# Patient Record
Sex: MALE | Race: OTHER | Employment: FULL TIME | URBAN - METROPOLITAN AREA
[De-identification: names, ages, dates, MRNs, and addresses within clinical notes are randomized per-mention and may not be internally consistent; named-entity substitution may affect disease eponyms.]

---

## 2017-01-23 ENCOUNTER — OFFICE VISIT (OUTPATIENT)
Dept: FAMILY MEDICINE CLINIC | Facility: CLINIC | Age: 52
End: 2017-01-23

## 2017-01-23 VITALS
DIASTOLIC BLOOD PRESSURE: 69 MMHG | WEIGHT: 173 LBS | HEART RATE: 72 BPM | SYSTOLIC BLOOD PRESSURE: 113 MMHG | BODY MASS INDEX: 26 KG/M2

## 2017-01-23 DIAGNOSIS — Z79.4 TYPE 2 DIABETES MELLITUS WITH DIABETIC POLYNEUROPATHY, WITH LONG-TERM CURRENT USE OF INSULIN (HCC): Primary | ICD-10-CM

## 2017-01-23 DIAGNOSIS — E78.2 MIXED HYPERLIPIDEMIA: ICD-10-CM

## 2017-01-23 DIAGNOSIS — E11.42 TYPE 2 DIABETES MELLITUS WITH DIABETIC POLYNEUROPATHY, WITH LONG-TERM CURRENT USE OF INSULIN (HCC): Primary | ICD-10-CM

## 2017-01-23 DIAGNOSIS — Z91.19 NONCOMPLIANCE: ICD-10-CM

## 2017-01-23 PROCEDURE — 99215 OFFICE O/P EST HI 40 MIN: CPT | Performed by: FAMILY MEDICINE

## 2017-01-23 PROCEDURE — 99212 OFFICE O/P EST SF 10 MIN: CPT | Performed by: FAMILY MEDICINE

## 2017-01-23 RX ORDER — BLOOD SUGAR DIAGNOSTIC
1 STRIP MISCELLANEOUS 4 TIMES DAILY
Qty: 400 EACH | Refills: 3 | Status: SHIPPED | OUTPATIENT
Start: 2017-01-23 | End: 2017-08-02

## 2017-01-23 RX ORDER — PEN NEEDLE, DIABETIC 31 G X1/4"
NEEDLE, DISPOSABLE MISCELLANEOUS
Qty: 400 EACH | Refills: 3 | Status: SHIPPED | OUTPATIENT
Start: 2017-01-23 | End: 2017-01-24 | Stop reason: RX

## 2017-01-23 RX ORDER — ASPIRIN 81 MG/1
81 TABLET ORAL DAILY
Qty: 100 TABLET | Refills: 3 | Status: SHIPPED | OUTPATIENT
Start: 2017-01-23 | End: 2017-08-02

## 2017-01-23 RX ORDER — LISINOPRIL 10 MG/1
10 TABLET ORAL DAILY
Qty: 90 TABLET | Refills: 0 | Status: SHIPPED | OUTPATIENT
Start: 2017-01-23 | End: 2017-08-02

## 2017-01-23 RX ORDER — ATORVASTATIN CALCIUM 20 MG/1
20 TABLET, FILM COATED ORAL
Qty: 90 TABLET | Refills: 0 | Status: SHIPPED | OUTPATIENT
Start: 2017-01-23 | End: 2017-08-02

## 2017-01-23 NOTE — PROGRESS NOTES
Diabetic Visit  Lost insurance and didn't use insulin. \"I haven't checked my sugar in year. \"  Noncompliant with diet checking and insulin. Has numbness bottom of left foot. Patient denies problems with their medication.   Denies ulcers, chest pain, d

## 2017-01-24 ENCOUNTER — TELEPHONE (OUTPATIENT)
Dept: FAMILY MEDICINE CLINIC | Facility: CLINIC | Age: 52
End: 2017-01-24

## 2017-01-24 NOTE — TELEPHONE ENCOUNTER
pen needles 31 x 6 are not in stock. Can it be changed to 4 x 32? Pharmacy will need a new Rx if changed.

## 2017-02-03 ENCOUNTER — APPOINTMENT (OUTPATIENT)
Dept: LAB | Age: 52
End: 2017-02-03
Attending: FAMILY MEDICINE
Payer: COMMERCIAL

## 2017-02-03 DIAGNOSIS — Z79.4 TYPE 2 DIABETES MELLITUS WITH DIABETIC POLYNEUROPATHY, WITH LONG-TERM CURRENT USE OF INSULIN (HCC): ICD-10-CM

## 2017-02-03 DIAGNOSIS — E11.42 TYPE 2 DIABETES MELLITUS WITH DIABETIC POLYNEUROPATHY, WITH LONG-TERM CURRENT USE OF INSULIN (HCC): ICD-10-CM

## 2017-02-03 LAB
ALBUMIN SERPL BCP-MCNC: 3.4 G/DL (ref 3.5–4.8)
ALBUMIN/GLOB SERPL: 1.1 {RATIO} (ref 1–2)
ALP SERPL-CCNC: 51 U/L (ref 32–100)
ALT SERPL-CCNC: 26 U/L (ref 17–63)
ANION GAP SERPL CALC-SCNC: 9 MMOL/L (ref 0–18)
AST SERPL-CCNC: 19 U/L (ref 15–41)
BILIRUB SERPL-MCNC: 0.6 MG/DL (ref 0.3–1.2)
BUN SERPL-MCNC: 11 MG/DL (ref 8–20)
BUN/CREAT SERPL: 21.2 (ref 10–20)
CALCIUM SERPL-MCNC: 8.6 MG/DL (ref 8.5–10.5)
CHLORIDE SERPL-SCNC: 104 MMOL/L (ref 95–110)
CHOLEST SERPL-MCNC: 159 MG/DL (ref 110–200)
CO2 SERPL-SCNC: 26 MMOL/L (ref 22–32)
CREAT SERPL-MCNC: 0.52 MG/DL (ref 0.5–1.5)
GLOBULIN PLAS-MCNC: 3 G/DL (ref 2.5–3.7)
GLUCOSE SERPL-MCNC: 146 MG/DL (ref 70–99)
HBA1C MFR BLD: 12 % (ref 4–6)
HDLC SERPL-MCNC: 50 MG/DL
LDLC SERPL CALC-MCNC: 89 MG/DL (ref 0–99)
NONHDLC SERPL-MCNC: 109 MG/DL
OSMOLALITY UR CALC.SUM OF ELEC: 290 MOSM/KG (ref 275–295)
POTASSIUM SERPL-SCNC: 3.9 MMOL/L (ref 3.3–5.1)
PROT SERPL-MCNC: 6.4 G/DL (ref 5.9–8.4)
SODIUM SERPL-SCNC: 139 MMOL/L (ref 136–144)
TRIGL SERPL-MCNC: 98 MG/DL (ref 1–149)

## 2017-02-03 PROCEDURE — 36415 COLL VENOUS BLD VENIPUNCTURE: CPT

## 2017-02-03 PROCEDURE — 80053 COMPREHEN METABOLIC PANEL: CPT

## 2017-02-03 PROCEDURE — 83036 HEMOGLOBIN GLYCOSYLATED A1C: CPT

## 2017-02-03 PROCEDURE — 80061 LIPID PANEL: CPT

## 2017-02-06 ENCOUNTER — TELEPHONE (OUTPATIENT)
Dept: FAMILY MEDICINE CLINIC | Facility: CLINIC | Age: 52
End: 2017-02-06

## 2017-02-06 NOTE — TELEPHONE ENCOUNTER
Notes Recorded by Lola Clements DO on 2/5/2017 at 2:29 PM  Error  Do not send normal mammogram report  Please call patient. [facility] had very high sugars have him follow up this week.

## 2017-02-06 NOTE — TELEPHONE ENCOUNTER
TYE-CELINA, please schedule an appt to discuss test results with [facility].

## 2017-02-07 ENCOUNTER — TELEPHONE (OUTPATIENT)
Dept: FAMILY MEDICINE CLINIC | Facility: CLINIC | Age: 52
End: 2017-02-07

## 2017-03-03 NOTE — TELEPHONE ENCOUNTER
Current Outpatient Prescriptions:  Insulin Lispro, Human, (HUMALOG KWIKPEN) 100 UNIT/ML Subcutaneous Solution 15 units SQ TID with meals Disp: 15 mL Rfl: 3     Patient requesting refill for meds, Please call when ready

## 2017-03-06 NOTE — TELEPHONE ENCOUNTER
Diabetes Medications: Failed per protocol please advise- patient has follow up scheduled 3/10/17    Protocol Criteria:  · Appointment scheduled in the past 6 months or the next 3 months  · A1C < 7.5 in the past 6 months  · Creatinine in the past 12 months

## 2017-03-13 RX ORDER — INSULIN LISPRO 100 [IU]/ML
INJECTION, SOLUTION INTRAVENOUS; SUBCUTANEOUS
Qty: 15 PEN | Refills: 3 | Status: SHIPPED | OUTPATIENT
Start: 2017-03-13 | End: 2017-08-02

## 2017-04-27 ENCOUNTER — TELEPHONE (OUTPATIENT)
Dept: INTERNAL MEDICINE CLINIC | Facility: CLINIC | Age: 52
End: 2017-04-27

## 2017-04-27 NOTE — TELEPHONE ENCOUNTER
[facility],but dr name is wrong.  They had lynn borjas  They will refax please call  [facility] with a update on auth  This if for the lantus

## 2017-04-28 ENCOUNTER — TELEPHONE (OUTPATIENT)
Dept: FAMILY MEDICINE CLINIC | Facility: CLINIC | Age: 52
End: 2017-04-28

## 2017-04-28 DIAGNOSIS — Z79.4 DIABETES MELLITUS TYPE 2, INSULIN DEPENDENT (HCC): Primary | ICD-10-CM

## 2017-04-28 DIAGNOSIS — E11.9 DIABETES MELLITUS TYPE 2, INSULIN DEPENDENT (HCC): Primary | ICD-10-CM

## 2017-04-29 NOTE — TELEPHONE ENCOUNTER
Please advise on Rx message below. Thank you.     LANTUS IS NOT COVERED BY INS PLAN ,,, PLEASE CHANGE TO BASAGLAR- TIER 1 OR  LEVEMIR - TIER 2

## 2017-05-02 NOTE — TELEPHONE ENCOUNTER
[phone] then [phone]. Pt needs a OV and A1c to be done.

## 2017-05-22 NOTE — TELEPHONE ENCOUNTER
No call back from pt to schedule appt. No answer at [phone], again lmtcb ext [phone] until 12 noon today only, then ext [phone] thereafter. Dr [facility],    Pharmacy stated alternatives for lantus is levimar or basaglar.

## 2017-08-08 ENCOUNTER — TELEPHONE (OUTPATIENT)
Dept: FAMILY MEDICINE CLINIC | Facility: CLINIC | Age: 52
End: 2017-08-08

## 2017-08-08 RX ORDER — ATORVASTATIN CALCIUM 20 MG/1
20 TABLET, FILM COATED ORAL
Qty: 90 TABLET | Refills: 0 | Status: SHIPPED
Start: 2017-08-08 | End: 2018-06-19

## 2017-08-08 RX ORDER — LISINOPRIL 10 MG/1
10 TABLET ORAL DAILY
Qty: 90 TABLET | Refills: 0 | Status: SHIPPED
Start: 2017-08-08 | End: 2018-06-19

## 2017-08-08 RX ORDER — BLOOD SUGAR DIAGNOSTIC
1 STRIP MISCELLANEOUS 4 TIMES DAILY
Qty: 400 EACH | Refills: 3 | Status: SHIPPED
Start: 2017-08-08

## 2017-08-08 RX ORDER — ASPIRIN 81 MG/1
81 TABLET ORAL DAILY
Qty: 100 TABLET | Refills: 0 | Status: SHIPPED
Start: 2017-08-08 | End: 2017-11-07

## 2017-08-08 NOTE — TELEPHONE ENCOUNTER
[facility] calling states rx for meter was received but no other rx for supplies was sent. requesting rx for test strips and lancets once touch ultra.

## 2017-08-08 NOTE — TELEPHONE ENCOUNTER
Hypertensive Medications  Protocol Criteria:  · Appointment scheduled in the past 6 months or in the next 3 months  · BMP or CMP in the past 12 months  · Creatinine result < 2  Recent Outpatient Visits            6 months ago Type 2 diabetes mellitus with Type II or unspecified type diabetes mellitus without mention of complication, uncontrolled [facility])    Avelina Perera, Laith Dixon MD    Office Visit    2 years ago Type II or unspecified type diabete

## 2017-08-09 NOTE — TELEPHONE ENCOUNTER
Test strips and lancets were included in original rx, but called into pharmacy. Quatity 100 on each with directions of use to test blood glucose three times daily.

## 2017-08-17 ENCOUNTER — OFFICE VISIT (OUTPATIENT)
Dept: FAMILY MEDICINE CLINIC | Facility: CLINIC | Age: 52
End: 2017-08-17

## 2017-08-17 VITALS
HEART RATE: 71 BPM | DIASTOLIC BLOOD PRESSURE: 57 MMHG | SYSTOLIC BLOOD PRESSURE: 93 MMHG | BODY MASS INDEX: 31 KG/M2 | WEIGHT: 202 LBS

## 2017-08-17 DIAGNOSIS — M79.672 PAIN IN BOTH FEET: ICD-10-CM

## 2017-08-17 DIAGNOSIS — IMO0001 DIABETES MELLITUS DUE TO UNDERLYING CONDITION, UNCONTROLLED, WITHOUT COMPLICATION, WITH LONG-TERM CURRENT USE OF INSULIN: Primary | ICD-10-CM

## 2017-08-17 DIAGNOSIS — Z72.0 TOBACCO ABUSE: ICD-10-CM

## 2017-08-17 DIAGNOSIS — M79.671 PAIN IN BOTH FEET: ICD-10-CM

## 2017-08-17 PROCEDURE — 99212 OFFICE O/P EST SF 10 MIN: CPT | Performed by: FAMILY MEDICINE

## 2017-08-17 PROCEDURE — 99214 OFFICE O/P EST MOD 30 MIN: CPT | Performed by: FAMILY MEDICINE

## 2017-08-17 NOTE — PATIENT INSTRUCTIONS
Cómo conseguir apoyo para dejar de fumar  Dejar de fumar no tiene por qué ser [employer]. Dígale a los demás que va a dejarlo. El [de-identified] de los amigos, compañeros de [place] y familiares puede significar ashanti gran diferencia.  Suzan Pulido Algunas veces es posible que necesite simplemente hablar con alguien cuando echa de menos el cigarrillo. Monique buena opción es hablar con otro ex fumador, ya que es más probable que entienda cómo se siente.  Es posible que necesite [de-identified] extra [employer] bisi

## 2017-08-17 NOTE — PROGRESS NOTES
I don't have insulin. Didn't get it when called in on the 8th for some reason. Now he has. 18 u humolog 2 x a day prior to meals. 20 basaglar 2 x a days. Eats 2 times a day sometimes 3 times a day.   \"when I use it its 130 or 140 sometimes 180 (when

## 2017-10-10 ENCOUNTER — APPOINTMENT (OUTPATIENT)
Dept: LAB | Age: 52
End: 2017-10-10
Attending: FAMILY MEDICINE
Payer: COMMERCIAL

## 2017-10-10 DIAGNOSIS — IMO0001 DIABETES MELLITUS DUE TO UNDERLYING CONDITION, UNCONTROLLED, WITHOUT COMPLICATION, WITH LONG-TERM CURRENT USE OF INSULIN: ICD-10-CM

## 2017-10-10 PROCEDURE — 82570 ASSAY OF URINE CREATININE: CPT

## 2017-10-10 PROCEDURE — 82043 UR ALBUMIN QUANTITATIVE: CPT

## 2017-10-10 PROCEDURE — 80053 COMPREHEN METABOLIC PANEL: CPT

## 2017-10-10 PROCEDURE — 83036 HEMOGLOBIN GLYCOSYLATED A1C: CPT

## 2017-10-10 PROCEDURE — 80061 LIPID PANEL: CPT

## 2017-10-10 PROCEDURE — 36415 COLL VENOUS BLD VENIPUNCTURE: CPT

## 2017-10-16 ENCOUNTER — TELEPHONE (OUTPATIENT)
Dept: OTHER | Age: 52
End: 2017-10-16

## 2017-10-16 NOTE — TELEPHONE ENCOUNTER
LMTCB via [facility] interpretor [phone], please transfer to Triage RN

## 2017-11-08 RX ORDER — ASPIRIN 81 MG/1
81 TABLET ORAL DAILY
Qty: 100 TABLET | Refills: 0 | Status: SHIPPED | OUTPATIENT
Start: 2017-11-08 | End: 2018-06-19

## 2017-11-08 NOTE — TELEPHONE ENCOUNTER
Refill Protocol Appointment Criteria  · Appointment scheduled in the past 6 months or in the next 3 months  Recent Outpatient Visits            2 months ago Diabetes mellitus due to underlying condition, uncontrolled, without complication, with long-term c

## 2017-11-27 NOTE — TELEPHONE ENCOUNTER
Please advise regarding pended refill request as unable to refill per protocol d/t last A1C= 9.0.     Diabetes Medications  Protocol Criteria:  · Appointment scheduled in the past 6 months or the next 3 months  · A1C < 7.5 in the past 6 months  · Creatinine

## 2017-11-28 RX ORDER — INSULIN LISPRO 100 [IU]/ML
INJECTION, SOLUTION INTRAVENOUS; SUBCUTANEOUS
Qty: 15 PEN | Refills: 3 | Status: SHIPPED | OUTPATIENT
Start: 2017-11-28 | End: 2018-06-19

## 2018-01-26 ENCOUNTER — TELEPHONE (OUTPATIENT)
Dept: FAMILY MEDICINE CLINIC | Facility: CLINIC | Age: 53
End: 2018-01-26

## 2018-01-26 RX ORDER — INSULIN GLARGINE 100 [IU]/ML
INJECTION, SOLUTION SUBCUTANEOUS
Qty: 15 PEN | Refills: 3 | Status: SHIPPED | OUTPATIENT
Start: 2018-01-26 | End: 2018-06-19

## 2018-01-26 NOTE — TELEPHONE ENCOUNTER
Nelson Womack from [facility] contacted to verify which medication was pending refill but she states that there was nothing in her work queue now. Nelson Womack informed that the Basaglar insulin was refill today and no further assistance was requested.

## 2018-06-19 RX ORDER — ATORVASTATIN CALCIUM 20 MG/1
20 TABLET, FILM COATED ORAL
Qty: 90 TABLET | Refills: 0 | Status: SHIPPED | OUTPATIENT
Start: 2018-06-19 | End: 2018-08-27

## 2018-06-19 RX ORDER — LISINOPRIL 10 MG/1
10 TABLET ORAL DAILY
Qty: 90 TABLET | Refills: 0 | Status: SHIPPED | OUTPATIENT
Start: 2018-06-19 | End: 2018-08-27

## 2018-06-19 RX ORDER — ASPIRIN 81 MG/1
81 TABLET ORAL DAILY
Qty: 90 TABLET | Refills: 0 | Status: SHIPPED | OUTPATIENT
Start: 2018-06-19 | End: 2018-08-27

## 2018-06-19 NOTE — TELEPHONE ENCOUNTER
DR Nixon 16: please advise on further refills to mail order. LOV 8/17/17.     Diabetes Medications  Protocol Criteria:  · Appointment scheduled in the past 6 months or the next 3 months  · A1C < 7.5 in the past 6 months  · Creatinine in the past 12 months  · Cr

## 2018-06-19 NOTE — TELEPHONE ENCOUNTER
Daughter states that new [facility] ins requires [facility].  Patient needs 90 day RX of all meds sent to [address] [place] [address] 100 UNIT/ML Subcutaneous Solution Pen-injector USE TO INJECT 35 UNITS SUBCUTANEOUSLY DAILY Disp: 15 pen R

## 2018-07-06 NOTE — TELEPHONE ENCOUNTER
Spoke with patient's daughter named Tapan Bela, per phone room patient has authorized for the clinic staff to speak with daughter. Daughter wanted to confirm that the prescriptions were sent to [facility].  Daughter made aware that in total 6 prescriptions

## 2018-08-27 ENCOUNTER — OFFICE VISIT (OUTPATIENT)
Dept: FAMILY MEDICINE CLINIC | Facility: CLINIC | Age: 53
End: 2018-08-27
Payer: COMMERCIAL

## 2018-08-27 VITALS
HEART RATE: 66 BPM | WEIGHT: 205 LBS | SYSTOLIC BLOOD PRESSURE: 117 MMHG | BODY MASS INDEX: 31 KG/M2 | DIASTOLIC BLOOD PRESSURE: 71 MMHG | TEMPERATURE: 98 F

## 2018-08-27 DIAGNOSIS — Z72.0 TOBACCO ABUSE: ICD-10-CM

## 2018-08-27 DIAGNOSIS — Z91.19 NONCOMPLIANCE: ICD-10-CM

## 2018-08-27 DIAGNOSIS — I10 ESSENTIAL HYPERTENSION: ICD-10-CM

## 2018-08-27 DIAGNOSIS — E78.2 MIXED HYPERLIPIDEMIA: ICD-10-CM

## 2018-08-27 DIAGNOSIS — IMO0001 DIABETES MELLITUS DUE TO UNDERLYING CONDITION, UNCONTROLLED, WITHOUT COMPLICATION, WITH LONG-TERM CURRENT USE OF INSULIN: ICD-10-CM

## 2018-08-27 PROCEDURE — 99214 OFFICE O/P EST MOD 30 MIN: CPT | Performed by: FAMILY MEDICINE

## 2018-08-27 PROCEDURE — 99212 OFFICE O/P EST SF 10 MIN: CPT | Performed by: FAMILY MEDICINE

## 2018-08-27 RX ORDER — BENAZEPRIL HYDROCHLORIDE 10 MG/1
10 TABLET ORAL DAILY
Qty: 90 TABLET | Refills: 3 | Status: SHIPPED | OUTPATIENT
Start: 2018-08-27 | End: 2018-08-27

## 2018-08-27 RX ORDER — BENAZEPRIL HYDROCHLORIDE 10 MG/1
10 TABLET ORAL DAILY
Qty: 90 TABLET | Refills: 3 | Status: SHIPPED | OUTPATIENT
Start: 2018-08-27 | End: 2019-08-20

## 2018-08-27 RX ORDER — LOVASTATIN 20 MG/1
20 TABLET ORAL NIGHTLY
Qty: 90 TABLET | Refills: 3 | Status: SHIPPED | OUTPATIENT
Start: 2018-08-27 | End: 2019-08-20

## 2018-08-27 RX ORDER — LOVASTATIN 20 MG/1
20 TABLET ORAL NIGHTLY
Qty: 90 TABLET | Refills: 3 | Status: SHIPPED | OUTPATIENT
Start: 2018-08-27 | End: 2018-08-27

## 2018-08-27 RX ORDER — ASPIRIN 81 MG/1
81 TABLET ORAL DAILY
Qty: 90 TABLET | Refills: 3 | Status: SHIPPED | OUTPATIENT
Start: 2018-08-27 | End: 2019-08-20

## 2018-08-27 NOTE — PROGRESS NOTES
[facility] insurance is not paying for some of the medication. \"  Ace and lipitor  Always a compliance problem with this patient.       Using 15 u  humalog   basaglar 15 u     Sugars have 170-190 at times  In am 130     Diabetic Visit    Patient denies problems w

## 2019-08-20 ENCOUNTER — APPOINTMENT (OUTPATIENT)
Dept: LAB | Age: 54
End: 2019-08-20
Attending: FAMILY MEDICINE
Payer: COMMERCIAL

## 2019-08-20 ENCOUNTER — OFFICE VISIT (OUTPATIENT)
Dept: FAMILY MEDICINE CLINIC | Facility: CLINIC | Age: 54
End: 2019-08-20
Payer: COMMERCIAL

## 2019-08-20 VITALS
HEART RATE: 65 BPM | BODY MASS INDEX: 29.03 KG/M2 | SYSTOLIC BLOOD PRESSURE: 118 MMHG | HEIGHT: 67 IN | WEIGHT: 185 LBS | TEMPERATURE: 98 F | DIASTOLIC BLOOD PRESSURE: 81 MMHG

## 2019-08-20 DIAGNOSIS — E11.8 DIABETES MELLITUS TYPE 2, UNCONTROLLED, WITH COMPLICATIONS (HCC): Primary | ICD-10-CM

## 2019-08-20 DIAGNOSIS — E11.65 DIABETES MELLITUS TYPE 2, UNCONTROLLED, WITH COMPLICATIONS (HCC): Primary | ICD-10-CM

## 2019-08-20 DIAGNOSIS — E11.65 DIABETES MELLITUS TYPE 2, UNCONTROLLED, WITH COMPLICATIONS (HCC): ICD-10-CM

## 2019-08-20 DIAGNOSIS — E11.8 DIABETES MELLITUS TYPE 2, UNCONTROLLED, WITH COMPLICATIONS (HCC): ICD-10-CM

## 2019-08-20 DIAGNOSIS — E78.2 MIXED HYPERLIPIDEMIA: ICD-10-CM

## 2019-08-20 DIAGNOSIS — B35.6 TINEA CRURIS: ICD-10-CM

## 2019-08-20 LAB
ALBUMIN SERPL-MCNC: 3.9 G/DL (ref 3.4–5)
ALBUMIN/GLOB SERPL: 1 {RATIO} (ref 1–2)
ALP LIVER SERPL-CCNC: 93 U/L (ref 45–117)
ALT SERPL-CCNC: 27 U/L (ref 16–61)
ANION GAP SERPL CALC-SCNC: 4 MMOL/L (ref 0–18)
AST SERPL-CCNC: 14 U/L (ref 15–37)
BILIRUB SERPL-MCNC: 0.7 MG/DL (ref 0.1–2)
BUN BLD-MCNC: 14 MG/DL (ref 7–18)
BUN/CREAT SERPL: 20 (ref 10–20)
CALCIUM BLD-MCNC: 9.1 MG/DL (ref 8.5–10.1)
CHLORIDE SERPL-SCNC: 104 MMOL/L (ref 98–112)
CHOLEST SMN-MCNC: 210 MG/DL (ref ?–200)
CO2 SERPL-SCNC: 29 MMOL/L (ref 21–32)
CREAT BLD-MCNC: 0.7 MG/DL (ref 0.7–1.3)
CREAT UR-SCNC: 54.5 MG/DL
EST. AVERAGE GLUCOSE BLD GHB EST-MCNC: 332 MG/DL (ref 68–126)
GLOBULIN PLAS-MCNC: 3.8 G/DL (ref 2.8–4.4)
GLUCOSE BLD-MCNC: 293 MG/DL (ref 70–99)
HBA1C MFR BLD HPLC: 13.2 % (ref ?–5.7)
HDLC SERPL-MCNC: 47 MG/DL (ref 40–59)
LDLC SERPL CALC-MCNC: 135 MG/DL (ref ?–100)
M PROTEIN MFR SERPL ELPH: 7.7 G/DL (ref 6.4–8.2)
MICROALBUMIN UR-MCNC: 1.77 MG/DL
MICROALBUMIN/CREAT 24H UR-RTO: 32.5 UG/MG (ref ?–30)
NONHDLC SERPL-MCNC: 163 MG/DL (ref ?–130)
OSMOLALITY SERPL CALC.SUM OF ELEC: 295 MOSM/KG (ref 275–295)
PATIENT FASTING: YES
PATIENT FASTING: YES
POTASSIUM SERPL-SCNC: 4.3 MMOL/L (ref 3.5–5.1)
SODIUM SERPL-SCNC: 137 MMOL/L (ref 136–145)
TRIGL SERPL-MCNC: 139 MG/DL (ref 30–149)
VLDLC SERPL CALC-MCNC: 28 MG/DL (ref 0–30)

## 2019-08-20 PROCEDURE — 82570 ASSAY OF URINE CREATININE: CPT

## 2019-08-20 PROCEDURE — 80053 COMPREHEN METABOLIC PANEL: CPT

## 2019-08-20 PROCEDURE — 83036 HEMOGLOBIN GLYCOSYLATED A1C: CPT

## 2019-08-20 PROCEDURE — 36415 COLL VENOUS BLD VENIPUNCTURE: CPT

## 2019-08-20 PROCEDURE — 82043 UR ALBUMIN QUANTITATIVE: CPT

## 2019-08-20 PROCEDURE — 99214 OFFICE O/P EST MOD 30 MIN: CPT | Performed by: FAMILY MEDICINE

## 2019-08-20 PROCEDURE — 80061 LIPID PANEL: CPT

## 2019-08-20 RX ORDER — NYSTATIN AND TRIAMCINOLONE ACETONIDE 100000; 1 [USP'U]/G; MG/G
OINTMENT TOPICAL
Qty: 15 G | Refills: 0 | Status: SHIPPED | OUTPATIENT
Start: 2019-08-20

## 2019-08-20 RX ORDER — ASPIRIN 81 MG/1
81 TABLET ORAL DAILY
Qty: 30 TABLET | Refills: 0 | Status: SHIPPED | OUTPATIENT
Start: 2019-08-20 | End: 2019-11-06

## 2019-08-20 RX ORDER — LISINOPRIL 10 MG/1
10 TABLET ORAL DAILY
Qty: 30 TABLET | Refills: 0 | Status: SHIPPED | OUTPATIENT
Start: 2019-08-20 | End: 2019-10-02

## 2019-08-20 NOTE — PROGRESS NOTES
Lost 20 lbs  Not on insulin for many months  Not checking sugars  Not compliant with follow ups  Not following diabetic diet. Not taking his cholesterol medication  Not taking his ace inhibitor  Not taking his aspirin.   Not compliant with repeat blood kyle

## 2019-08-23 ENCOUNTER — TELEPHONE (OUTPATIENT)
Dept: FAMILY MEDICINE CLINIC | Facility: CLINIC | Age: 54
End: 2019-08-23

## 2019-08-23 NOTE — TELEPHONE ENCOUNTER
----- Message from Tom Ceron DO sent at 8/22/2019  5:51 PM CDT -----  Cholesterol blood sugar very high continue on the restarted medications as prescribed. Repeat diabetic panel 3 months.   Follow-up 2 weeks bring in glucose meter

## 2019-09-03 ENCOUNTER — OFFICE VISIT (OUTPATIENT)
Dept: FAMILY MEDICINE CLINIC | Facility: CLINIC | Age: 54
End: 2019-09-03
Payer: COMMERCIAL

## 2019-09-03 VITALS
SYSTOLIC BLOOD PRESSURE: 120 MMHG | WEIGHT: 188 LBS | TEMPERATURE: 98 F | HEART RATE: 64 BPM | BODY MASS INDEX: 29 KG/M2 | DIASTOLIC BLOOD PRESSURE: 77 MMHG

## 2019-09-03 DIAGNOSIS — I10 ESSENTIAL HYPERTENSION: ICD-10-CM

## 2019-09-03 DIAGNOSIS — E78.2 MIXED HYPERLIPIDEMIA: ICD-10-CM

## 2019-09-03 DIAGNOSIS — E11.65 DIABETES MELLITUS TYPE 2, UNCONTROLLED, WITH COMPLICATIONS (HCC): Primary | ICD-10-CM

## 2019-09-03 DIAGNOSIS — Z91.19 NONCOMPLIANCE: ICD-10-CM

## 2019-09-03 DIAGNOSIS — L71.9 ROSACEA: ICD-10-CM

## 2019-09-03 DIAGNOSIS — E11.8 DIABETES MELLITUS TYPE 2, UNCONTROLLED, WITH COMPLICATIONS (HCC): Primary | ICD-10-CM

## 2019-09-03 PROCEDURE — 90471 IMMUNIZATION ADMIN: CPT | Performed by: FAMILY MEDICINE

## 2019-09-03 PROCEDURE — 99214 OFFICE O/P EST MOD 30 MIN: CPT | Performed by: FAMILY MEDICINE

## 2019-09-03 PROCEDURE — 90670 PCV13 VACCINE IM: CPT | Performed by: FAMILY MEDICINE

## 2019-09-03 RX ORDER — METRONIDAZOLE 10 MG/G
1 GEL TOPICAL 2 TIMES DAILY
Qty: 60 G | Refills: 2 | Status: SHIPPED | OUTPATIENT
Start: 2019-09-03 | End: 2020-01-01

## 2019-09-03 NOTE — PROGRESS NOTES
160 150 sugars   With medication    bp fine  No sob    No low sugars no hypoglycmeic episodes    lantus 35 u at night    humalog 25 u two times aday      Exam    Patient denies problems with their medication.   Denies ulcers, chest pain, dyspnea on exertion

## 2019-10-03 RX ORDER — LISINOPRIL 10 MG/1
TABLET ORAL
Qty: 90 TABLET | Refills: 1 | Status: SHIPPED | OUTPATIENT
Start: 2019-10-03 | End: 2020-04-13

## 2019-10-08 ENCOUNTER — TELEPHONE (OUTPATIENT)
Dept: OTHER | Age: 54
End: 2019-10-08

## 2019-10-08 NOTE — TELEPHONE ENCOUNTER
I pended Levemir 45 units nightly. 15 pens and 3 refills if agreed please sign the order. Taken from a new folder called prior authorizations. Dami Pham, DO  P Em Rn Triage             Switch to levemir.         [facility]   i

## 2019-10-15 ENCOUNTER — TELEPHONE (OUTPATIENT)
Dept: FAMILY MEDICINE CLINIC | Facility: CLINIC | Age: 54
End: 2019-10-15

## 2019-10-15 NOTE — TELEPHONE ENCOUNTER
pts wife called stated pt needs a Prior [employer]  for insulin glargine (BASAGLAR KWIKPEN) 100 UNIT/ML Subcutaneous Solution Pen-injector      Pt is out of medication

## 2019-10-16 NOTE — TELEPHONE ENCOUNTER
The requested medication has been discontinued, please advise    insulin glargine (BASAGLAR KWIKPEN) 100 UNIT/ML Subcutaneous Solution Pen-injector (Discontinued) 15 pen 3 8/27/2018 8/20/2019    Sig: USE TO INJECT 35 UNITS SUBCUTANEOUSLY DAILY    Sent to [facility]

## 2019-10-18 NOTE — TELEPHONE ENCOUNTER
LMTCB please transfer to triage. 2 attempt. Thanks  I have also sent pt a [employer] message to call us back.

## 2019-10-19 NOTE — TELEPHONE ENCOUNTER
Attempt made to contact patient at the home number and this nurse received a busy tone. Attempt made to contact patient at the emergency contact number and received a prompt stating this nurse was calling the [facility].  This nurse called [phone]

## 2019-10-21 NOTE — TELEPHONE ENCOUNTER
LMTCB, transfer to triage    Pt has not read [employer] message. Doctor, would you like certified letter sent to pt?

## 2019-11-06 RX ORDER — ASPIRIN 81 MG/1
81 TABLET ORAL DAILY
Qty: 90 TABLET | Refills: 3 | Status: SHIPPED | OUTPATIENT
Start: 2019-11-06

## 2019-11-06 RX ORDER — ROSUVASTATIN CALCIUM 20 MG/1
20 TABLET, COATED ORAL NIGHTLY
Qty: 90 TABLET | Refills: 3 | Status: SHIPPED | OUTPATIENT
Start: 2019-11-06

## 2019-11-06 NOTE — TELEPHONE ENCOUNTER
With [place] interpretor, advised patient of Dr. Jordan Olivarez' note. Patient indicated that he is on basaglar and humalog. States he is not on levemir. Basaglar 30-35 units daily but states was decreased to 25 units. humalog is 25 units before meals.  Has been

## 2019-11-06 NOTE — TELEPHONE ENCOUNTER
Increase basaglar as directed to 45 u slowly as previously directed. Increase basaglar from 25 u  By 3 u every 3 days until fasting sugar is less than 150 in the am then stay at that dose. Don't use more than 45 u of basaglar.   Follow up with Min next we

## 2020-03-10 NOTE — TELEPHONE ENCOUNTER
Current Outpatient Medications:     •  insulin glargine (BASAGLAR KWIKPEN) 100 UNIT/ML Subcutaneous Solution Pen-injector, Inject 35-45 units daily as directed., Disp: 15 mL, Rfl: 3  •

## 2020-04-13 RX ORDER — LISINOPRIL 10 MG/1
10 TABLET ORAL DAILY
Qty: 90 TABLET | Refills: 1 | Status: SHIPPED | OUTPATIENT
Start: 2020-04-13

## 2020-04-13 NOTE — TELEPHONE ENCOUNTER
Please review; protocol failed. Requested Prescriptions     Pending Prescriptions Disp Refills   • lisinopril 10 MG Oral Tab 90 tablet 1     Sig: Take 1 tablet (10 mg total) by mouth daily.          Recent Visits  Date Type Provider Dept   09/03/19 [facility]

## 2020-04-13 NOTE — TELEPHONE ENCOUNTER
Current Outpatient Medications   • LISINOPRIL 10 MG Oral Tab TAKE 1 TABLET BY MOUTH EVERY DAY 90 tablet 1

## 2020-06-16 NOTE — TELEPHONE ENCOUNTER
This is being sent to you without review by the Triage staff due to the high call volumes created by the COVID-19 virus, per the email sent by Dr. Hanna Ye on 3/19/20. Thank you for your support.     Centralized Nurse Triage Team

## 2024-03-04 NOTE — TELEPHONE ENCOUNTER
Please review; protocol failed. Requested Prescriptions     Pending Prescriptions Disp Refills   • HUMALOG KWIKPEN 100 UNIT/ML Subcutaneous Solution Pen-injector [Pharmacy Med Name: HUMALOG 100 UNITS/ML KWIKPEN]  0     Sig: 10-20 U PRIOR TO MEALS. room air

## (undated) NOTE — LETTER
10/22/2019              Titus Cristina        [id number] [address] [facility] [id number]         Dear [facility],    This letter is to inform you that our office has made several attempts to reach you by phone without success.   We were attempting to contact yo

## (undated) NOTE — LETTER
09/26/18        [address] [place] [address]      Dear Hieu Adkins,    Our records indicate that you have outstanding lab work and or testing that was ordered for you and has not yet been completed:  Orders Placed This Encounter      Com

## (undated) NOTE — MR AVS SNAPSHOT
[facility]  [address] [place] [address], [address] [facility]  [place]               Thank you for choosing us for your health care visit with Philly Regan. DO Raghav.   We are glad to serve you and happy to provide you with this summary Commonly known as:  LANHELLENUS SOLOSTAR           * Insulin Lispro 100 UNIT/ML Soln   15 units SQ TID with meals   What changed:  Another medication with the same name was added. Make sure you understand how and when to take each.    Commonly known as:  HUMALOG Comp Metabolic Panel (14)    Complete by:  Jan 23, 2017 (Approximate)    Assoc Dx:  Type 2 diabetes mellitus with diabetic polyneuropathy, with long-term current use of insulin ([facility] [E11.42, Z79.4]           Hemoglobin A1C    Complete by:  Jan 23, 2017 ( If you are confident that your benefit plan will not require a referral or authorization, such as [place] Medicaid, please feel free to schedule your appointment immediately.  However, if you are unsure about the requirements for authorization, please wait including white bread, rice and pasta   Eat plenty of protein, keep the fat content low Sugars:  sodas and sports drinks, candies and desserts   Eat plenty of low-fat dairy products High fat meats and dairy   Choose whole grain products Foods high in sodiu

## (undated) NOTE — LETTER
07/01/20        [address] [facility]      Dear Anita Lim,    [address] [facility] records indicate that you have outstanding lab work and or testing that was ordered for you and has not yet been completed:  Orders Placed This Encounter      CT